# Patient Record
Sex: MALE | Race: OTHER
[De-identification: names, ages, dates, MRNs, and addresses within clinical notes are randomized per-mention and may not be internally consistent; named-entity substitution may affect disease eponyms.]

---

## 2018-07-10 ENCOUNTER — HOSPITAL ENCOUNTER (EMERGENCY)
Dept: HOSPITAL 84 - D.ER | Age: 28
Discharge: HOME | End: 2018-07-10
Payer: COMMERCIAL

## 2018-07-10 VITALS — DIASTOLIC BLOOD PRESSURE: 74 MMHG | SYSTOLIC BLOOD PRESSURE: 125 MMHG

## 2018-07-10 VITALS — HEIGHT: 70 IN | WEIGHT: 175 LBS | BODY MASS INDEX: 25.05 KG/M2

## 2018-07-10 DIAGNOSIS — F17.200: ICD-10-CM

## 2018-07-10 DIAGNOSIS — K08.89: Primary | ICD-10-CM

## 2018-07-10 DIAGNOSIS — K02.9: ICD-10-CM

## 2018-07-10 DIAGNOSIS — K04.7: ICD-10-CM

## 2020-06-26 ENCOUNTER — HOSPITAL ENCOUNTER (INPATIENT)
Dept: HOSPITAL 84 - D.ER | Age: 30
LOS: 5 days | Discharge: HOME | DRG: 854 | End: 2020-07-01
Attending: FAMILY MEDICINE | Admitting: FAMILY MEDICINE
Payer: MEDICAID

## 2020-06-26 VITALS
WEIGHT: 184.39 LBS | BODY MASS INDEX: 26.4 KG/M2 | HEIGHT: 70 IN | WEIGHT: 184.39 LBS | BODY MASS INDEX: 26.4 KG/M2 | BODY MASS INDEX: 26.4 KG/M2 | HEIGHT: 70 IN

## 2020-06-26 VITALS — DIASTOLIC BLOOD PRESSURE: 81 MMHG | SYSTOLIC BLOOD PRESSURE: 135 MMHG

## 2020-06-26 VITALS — DIASTOLIC BLOOD PRESSURE: 69 MMHG | SYSTOLIC BLOOD PRESSURE: 124 MMHG

## 2020-06-26 DIAGNOSIS — A41.9: Primary | ICD-10-CM

## 2020-06-26 DIAGNOSIS — F11.90: ICD-10-CM

## 2020-06-26 DIAGNOSIS — N43.3: ICD-10-CM

## 2020-06-26 DIAGNOSIS — N39.0: ICD-10-CM

## 2020-06-26 DIAGNOSIS — F15.90: ICD-10-CM

## 2020-06-26 DIAGNOSIS — F12.90: ICD-10-CM

## 2020-06-26 DIAGNOSIS — D64.9: ICD-10-CM

## 2020-06-26 DIAGNOSIS — N50.3: ICD-10-CM

## 2020-06-26 DIAGNOSIS — K02.9: ICD-10-CM

## 2020-06-26 DIAGNOSIS — N12: ICD-10-CM

## 2020-06-26 LAB
ALBUMIN SERPL-MCNC: 3.9 G/DL (ref 3.4–5)
ALP SERPL-CCNC: 85 U/L (ref 30–120)
ALT SERPL-CCNC: 24 U/L (ref 10–68)
AMYLASE SERPL-CCNC: 21 U/L (ref 25–115)
ANION GAP SERPL CALC-SCNC: 13.3 MMOL/L (ref 8–16)
BACTERIA #/AREA URNS HPF: (no result) /HPF
BASOPHILS NFR BLD AUTO: 1 % (ref 0–2)
BILIRUB SERPL-MCNC: 0.39 MG/DL (ref 0.2–1.3)
BILIRUB SERPL-MCNC: NEGATIVE MG/DL
BUN SERPL-MCNC: 12 MG/DL (ref 7–18)
CALCIUM SERPL-MCNC: 9 MG/DL (ref 8.5–10.1)
CHLORIDE SERPL-SCNC: 101 MMOL/L (ref 98–107)
CO2 SERPL-SCNC: 26.6 MMOL/L (ref 21–32)
CREAT SERPL-MCNC: 0.8 MG/DL (ref 0.6–1.3)
ERYTHROCYTE [DISTWIDTH] IN BLOOD BY AUTOMATED COUNT: 13.8 % (ref 11.5–14.5)
GLOBULIN SER-MCNC: 3.9 G/L
GLUCOSE SERPL-MCNC: 119 MG/DL (ref 74–106)
GLUCOSE SERPL-MCNC: NEGATIVE MG/DL
HCT VFR BLD CALC: 39.1 % (ref 42–54)
HGB BLD-MCNC: 13.2 G/DL (ref 13.5–17.5)
KETONES UR STRIP-MCNC: (no result) MG/DL
LIPASE SERPL-CCNC: 39 U/L (ref 73–393)
LYMPHOCYTES NFR BLD AUTO: 10 % (ref 15–50)
MCH RBC QN AUTO: 31 PG (ref 26–34)
MCHC RBC AUTO-ENTMCNC: 33.8 G/DL (ref 31–37)
MCV RBC: 91.8 FL (ref 80–100)
MONOCYTES NFR BLD: 11 % (ref 2–11)
NEUTROPHILS NFR BLD AUTO: 78 % (ref 40–80)
NITRITE UR-MCNC: NEGATIVE MG/ML
OSMOLALITY SERPL CALC.SUM OF ELEC: 274 MOSM/KG (ref 275–300)
PH UR STRIP: 8 [PH] (ref 5–6)
PLATELET # BLD EST: NORMAL 10*3/UL
PLATELET # BLD: 384 10X3/UL (ref 130–400)
PMV BLD AUTO: 9.2 FL (ref 7.4–10.4)
POTASSIUM SERPL-SCNC: 3.9 MMOL/L (ref 3.5–5.1)
PROT SERPL-MCNC: 7.8 G/DL (ref 6.4–8.2)
RBC # BLD AUTO: 4.26 10X6/UL (ref 4.2–6.1)
RBC #/AREA URNS HPF: (no result) /HPF (ref 0–5)
SODIUM SERPL-SCNC: 137 MMOL/L (ref 136–145)
SP GR UR STRIP: 1.01 (ref 1–1.02)
SQUAMOUS #/AREA URNS HPF: (no result) /HPF (ref 0–5)
TROPONIN I SERPL-MCNC: < 0.017 NG/ML (ref 0–0.06)
UROBILINOGEN UR-MCNC: 4 MG/DL
WBC # BLD AUTO: 32.8 10X3/UL (ref 4.8–10.8)

## 2020-06-26 NOTE — NUR
RECIEVED PT FROM ED. PT IS ROLLING AROUND IN BED IN PAIN. IT IS APPARENT THAT
THE PATIENT IS IN PAIN. RATES PAIN ON A SCALE AS A 10/10. RIGHT FLANK PAIN,
BACK PAIN, AND PAIN IN ONE TESTICLE IS BEING REPORTED. WILL ADMINISTER PRN
TYLENOL. APPEARS THAT MORPHINE IS NOT MANAGING OR HELPING PAIN SUBSIDE,
REQUESTING ANY DIFFERENT KIND OF MEDICATION.

## 2020-06-26 NOTE — NUR
ADMINISTERED PRN TYLENOL FOR 10/10 PAIN. LEFT PT RESTING IN BED, VERY
UNCOMFORTABLE AND COMPLAINING OF SEVERE PAIN. FAMILY AT BEDSIDE. WILL CONTINUE
TO MONITOR.

## 2020-06-26 NOTE — NUR
ADMINISTERED PRN DEMEROL FOR 10/10. PT SEEMS TO STILL BE IN MODERATE/SEVERE
PAIN BASED ON FACIAL GRIMACING. RESTING IN BED, FAMILY AT BEDSIDE. PROVIDED
WITH URINE SPECIMEN CUP. WILL CONTINUE TO MONITOR.

## 2020-06-26 NOTE — NUR
ADMINISTERED PRN DEMEROL FOR PAIN 4/10. TOLERATED WELL. FAMILY AT BEDSIDE.
QUESTIONED ABOUT TESTS AND RESULTS, ANSWERED ALL QUESTIONS. DENIES ANY OTHER
NEEDS. WILL CONTINUE TO MONITOR.

## 2020-06-26 NOTE — NUR
NEW BAG OF FLUIDS HUNG. PT RESTING IN BED, DEMEROL APPEARS TO HAVE HELPED
SUBSIDE PT PAIN. REPORTS TO BE "IN LESS PAIN" NOW. WILL CONTINUE TO MONITOR.
FAMILY IS AT BEDSIDE.

## 2020-06-27 VITALS — SYSTOLIC BLOOD PRESSURE: 120 MMHG | DIASTOLIC BLOOD PRESSURE: 71 MMHG

## 2020-06-27 VITALS — DIASTOLIC BLOOD PRESSURE: 58 MMHG | SYSTOLIC BLOOD PRESSURE: 109 MMHG

## 2020-06-27 VITALS — SYSTOLIC BLOOD PRESSURE: 101 MMHG | DIASTOLIC BLOOD PRESSURE: 62 MMHG

## 2020-06-27 VITALS — SYSTOLIC BLOOD PRESSURE: 123 MMHG | DIASTOLIC BLOOD PRESSURE: 80 MMHG

## 2020-06-27 VITALS — SYSTOLIC BLOOD PRESSURE: 124 MMHG | DIASTOLIC BLOOD PRESSURE: 79 MMHG

## 2020-06-27 VITALS — SYSTOLIC BLOOD PRESSURE: 120 MMHG | DIASTOLIC BLOOD PRESSURE: 78 MMHG

## 2020-06-27 VITALS — DIASTOLIC BLOOD PRESSURE: 83 MMHG | SYSTOLIC BLOOD PRESSURE: 121 MMHG

## 2020-06-27 VITALS — SYSTOLIC BLOOD PRESSURE: 97 MMHG | DIASTOLIC BLOOD PRESSURE: 49 MMHG

## 2020-06-27 VITALS — DIASTOLIC BLOOD PRESSURE: 74 MMHG | SYSTOLIC BLOOD PRESSURE: 118 MMHG

## 2020-06-27 VITALS — SYSTOLIC BLOOD PRESSURE: 119 MMHG | DIASTOLIC BLOOD PRESSURE: 63 MMHG

## 2020-06-27 LAB
ALBUMIN SERPL-MCNC: 2.6 G/DL (ref 3.4–5)
ALP SERPL-CCNC: 84 U/L (ref 30–120)
ALT SERPL-CCNC: 18 U/L (ref 10–68)
AMPHETAMINES UR QL SCN: POSITIVE QUAL
ANION GAP SERPL CALC-SCNC: 7.4 MMOL/L (ref 8–16)
BARBITURATES UR QL SCN: NEGATIVE QUAL
BASOPHILS NFR BLD AUTO: 0.1 % (ref 0–2)
BENZODIAZ UR QL SCN: POSITIVE QUAL
BILIRUB SERPL-MCNC: 0.38 MG/DL (ref 0.2–1.3)
BUN SERPL-MCNC: 8 MG/DL (ref 7–18)
BZE UR QL SCN: NEGATIVE QUAL
CALCIUM SERPL-MCNC: 8.1 MG/DL (ref 8.5–10.1)
CANNABINOIDS UR QL SCN: POSITIVE QUAL
CHLORIDE SERPL-SCNC: 104 MMOL/L (ref 98–107)
CO2 SERPL-SCNC: 27.9 MMOL/L (ref 21–32)
CREAT SERPL-MCNC: 0.9 MG/DL (ref 0.6–1.3)
CRP SERPL-MCNC: 30.1 MG/DL (ref 0–0.9)
EOSINOPHIL NFR BLD: 5.8 % (ref 0–7)
ERYTHROCYTE [DISTWIDTH] IN BLOOD BY AUTOMATED COUNT: 14.2 % (ref 11.5–14.5)
GLOBULIN SER-MCNC: 3.6 G/L
GLUCOSE SERPL-MCNC: 97 MG/DL (ref 74–106)
HCT VFR BLD CALC: 33.5 % (ref 42–54)
HGB BLD-MCNC: 10.7 G/DL (ref 13.5–17.5)
IMM GRANULOCYTES NFR BLD: 2 % (ref 0–5)
LYMPHOCYTES NFR BLD AUTO: 6.6 % (ref 15–50)
MCH RBC QN AUTO: 30 PG (ref 26–34)
MCHC RBC AUTO-ENTMCNC: 31.9 G/DL (ref 31–37)
MCV RBC: 93.8 FL (ref 80–100)
MONOCYTES NFR BLD: 4.7 % (ref 2–11)
NEUTROPHILS NFR BLD AUTO: 80.8 % (ref 40–80)
OPIATES UR QL SCN: POSITIVE QUAL
OSMOLALITY SERPL CALC.SUM OF ELEC: 269 MOSM/KG (ref 275–300)
PCP UR QL SCN: NEGATIVE QUAL
PLATELET # BLD: 334 10X3/UL (ref 130–400)
PMV BLD AUTO: 9.5 FL (ref 7.4–10.4)
POTASSIUM SERPL-SCNC: 3.3 MMOL/L (ref 3.5–5.1)
PROT SERPL-MCNC: 6.2 G/DL (ref 6.4–8.2)
RBC # BLD AUTO: 3.57 10X6/UL (ref 4.2–6.1)
SODIUM SERPL-SCNC: 136 MMOL/L (ref 136–145)
WBC # BLD AUTO: 37.5 10X3/UL (ref 4.8–10.8)

## 2020-06-27 PROCEDURE — 05HM33Z INSERTION OF INFUSION DEVICE INTO RIGHT INTERNAL JUGULAR VEIN, PERCUTANEOUS APPROACH: ICD-10-PCS | Performed by: SURGERY

## 2020-06-27 PROCEDURE — 0WJG4ZZ INSPECTION OF PERITONEAL CAVITY, PERCUTANEOUS ENDOSCOPIC APPROACH: ICD-10-PCS | Performed by: SURGERY

## 2020-06-27 PROCEDURE — 0DTJ4ZZ RESECTION OF APPENDIX, PERCUTANEOUS ENDOSCOPIC APPROACH: ICD-10-PCS | Performed by: SURGERY

## 2020-06-27 NOTE — NUR
PT IS HERE FROM PACU IN STABLE CONDITION A&OX4, BUT DROGGY. HIS VSS. DONE FULL
ASSESSMENT AND WILL DOC IN FLOW SHEET. BRO IS OBSERVED HANGING BELOW BLADDER
AND DRAINING. ART LINE IS CDI SECURE WITH ARM BOARD TO LEFT WRIST. RIGHT IJ IS
CDI IN THE RIGHT NECK. PT IS SLEEPY AND I HAVE TO WAKE HIM TO ASK IF HE IS
HURTING AND HE SAYS "YES, IN MY STOMACH". HE HAS RECIVED 2MG OF DAILAUDID IV
FROM PACU. I WILL LOOK AT MY NEW ORDERS TO SEE WHAT PT HAS HERE IN ICU. HIS
BED IS LOW,SIDE RAISLX2, ORIENTED TO HIS CALL LIGHT. BED ALARM IS ON

## 2020-06-27 NOTE — NUR
PT IS RESTING IN BED WITH EYES CLOSED. AWAKES EASILY. HE IS A&OX4, VSS.
PERFORMED RE-ASSESSMENT AT THIS TIME AND WILL DOC IN FLOW SHEET. PT VOICES NO
NEEDS OR C/O AT THIS TIME. BED IS LOW,SIDE RAISLX2, CALL LIGHT WITHIN REACH.
BED ALARM IS ON

## 2020-06-27 NOTE — NUR
PT TAKEN TO SURGERY. MOTHER TOLD SHE COULD WALK WITH THEM THEN RETURN TO ROOM
OR ICU WAITING ROOM AND WAIT FOR DR. PALMA.

## 2020-06-27 NOTE — NUR
pt used call light and asked if he could have something to eat. provided him
with some jello and educated him that because he had surgery its better to
start slow and light. he voiced"ok". i also informed him that if he is to get
nauseus at any time to let me know and to stop eating. he vocalized
understanding. VSS. bed is low,side raislx2,call light within rech. bed alarm
is on. will conitnue to monitor

## 2020-06-27 NOTE — NUR
PT RESTING COMFORTABLY. BREATHS EVEN/REGULAR AND UNLABORED. NO SIGNS OR
SYMPTOMS OF ACUTE DISTRESSS NTOED AT THIS TIME. CL IN REACH, SRX2.

## 2020-06-27 NOTE — NUR
PT ARRIVED FROM SURGERY TO ICU BED. PT IS A&O X4. PT IS COMMUNICATING AND
VERBALIZED UNDERSTANDING AND LINES TO BE CAUTIOUS OF. PT HAS A L.FA PIV, A
LEFT RADIUS ART LINE AND A RIGHT IJ TRIPLE LUMEN CVL. ALL LINES HAVE SWAB CAPS
IN USE. PT HAS 3 LAP INCISIONS WITH BAND-AIDS CDI. BRO CATHETER IN PLACE AND
DRAINING TO GRAVITY CLEAR YELLOW URINE OFF R.SIDE OF BED. VSS. PACU NURSE GAVE
PRIMARY ICU RN BEDSIDE REPORT. PTS MOTHER WAS NOTIFIED AND UPDATED AND
 SPOKE TO HER. NO FURTHER NEEDS AT THIS TIME. WILL CPOC.

## 2020-06-27 NOTE — NUR
pt is resting in bed with eyes closed snoring. HIS VSS.  awakes easily. voices
pain is "6/10" numeric scale after reciving norco po for prn pain. he voices
no needs or other concerns. his 3 abd incisions are CDI c no s/s of bleeding.
bed is low,side rialsx2,call light within reach. will contineu to monitor

## 2020-06-28 VITALS — DIASTOLIC BLOOD PRESSURE: 77 MMHG | SYSTOLIC BLOOD PRESSURE: 116 MMHG

## 2020-06-28 VITALS — DIASTOLIC BLOOD PRESSURE: 67 MMHG | SYSTOLIC BLOOD PRESSURE: 113 MMHG

## 2020-06-28 VITALS — DIASTOLIC BLOOD PRESSURE: 75 MMHG | SYSTOLIC BLOOD PRESSURE: 114 MMHG

## 2020-06-28 VITALS — SYSTOLIC BLOOD PRESSURE: 118 MMHG | DIASTOLIC BLOOD PRESSURE: 75 MMHG

## 2020-06-28 VITALS — DIASTOLIC BLOOD PRESSURE: 81 MMHG | SYSTOLIC BLOOD PRESSURE: 130 MMHG

## 2020-06-28 VITALS — DIASTOLIC BLOOD PRESSURE: 68 MMHG | SYSTOLIC BLOOD PRESSURE: 123 MMHG

## 2020-06-28 VITALS — SYSTOLIC BLOOD PRESSURE: 120 MMHG | DIASTOLIC BLOOD PRESSURE: 76 MMHG

## 2020-06-28 VITALS — DIASTOLIC BLOOD PRESSURE: 68 MMHG | SYSTOLIC BLOOD PRESSURE: 128 MMHG

## 2020-06-28 VITALS — SYSTOLIC BLOOD PRESSURE: 132 MMHG | DIASTOLIC BLOOD PRESSURE: 87 MMHG

## 2020-06-28 VITALS — SYSTOLIC BLOOD PRESSURE: 119 MMHG | DIASTOLIC BLOOD PRESSURE: 61 MMHG

## 2020-06-28 VITALS — SYSTOLIC BLOOD PRESSURE: 118 MMHG | DIASTOLIC BLOOD PRESSURE: 73 MMHG

## 2020-06-28 VITALS — SYSTOLIC BLOOD PRESSURE: 113 MMHG | DIASTOLIC BLOOD PRESSURE: 70 MMHG

## 2020-06-28 VITALS — SYSTOLIC BLOOD PRESSURE: 117 MMHG | DIASTOLIC BLOOD PRESSURE: 73 MMHG

## 2020-06-28 VITALS — DIASTOLIC BLOOD PRESSURE: 75 MMHG | SYSTOLIC BLOOD PRESSURE: 111 MMHG

## 2020-06-28 VITALS — SYSTOLIC BLOOD PRESSURE: 130 MMHG | DIASTOLIC BLOOD PRESSURE: 65 MMHG

## 2020-06-28 VITALS — DIASTOLIC BLOOD PRESSURE: 68 MMHG | SYSTOLIC BLOOD PRESSURE: 120 MMHG

## 2020-06-28 VITALS — SYSTOLIC BLOOD PRESSURE: 110 MMHG | DIASTOLIC BLOOD PRESSURE: 58 MMHG

## 2020-06-28 VITALS — SYSTOLIC BLOOD PRESSURE: 113 MMHG | DIASTOLIC BLOOD PRESSURE: 68 MMHG

## 2020-06-28 VITALS — SYSTOLIC BLOOD PRESSURE: 129 MMHG | DIASTOLIC BLOOD PRESSURE: 78 MMHG

## 2020-06-28 VITALS — SYSTOLIC BLOOD PRESSURE: 108 MMHG | DIASTOLIC BLOOD PRESSURE: 58 MMHG

## 2020-06-28 LAB
EOSINOPHIL NFR BLD: 1 % (ref 0–7)
ERYTHROCYTE [DISTWIDTH] IN BLOOD BY AUTOMATED COUNT: 14.2 % (ref 11.5–14.5)
HCT VFR BLD CALC: 33.5 % (ref 42–54)
HGB BLD-MCNC: 10.9 G/DL (ref 13.5–17.5)
LYMPHOCYTES NFR BLD AUTO: 7 % (ref 15–50)
MCH RBC QN AUTO: 30.4 PG (ref 26–34)
MCHC RBC AUTO-ENTMCNC: 32.5 G/DL (ref 31–37)
MCV RBC: 93.6 FL (ref 80–100)
MONOCYTES NFR BLD: 9 % (ref 2–11)
NEUTROPHILS NFR BLD AUTO: 83 % (ref 40–80)
PLATELET # BLD EST: NORMAL 10*3/UL
PLATELET # BLD: 348 10X3/UL (ref 130–400)
PMV BLD AUTO: 9.7 FL (ref 7.4–10.4)
RBC # BLD AUTO: 3.58 10X6/UL (ref 4.2–6.1)
WBC # BLD AUTO: 24.8 10X3/UL (ref 4.8–10.8)

## 2020-06-28 NOTE — NUR
RESTING IN BED AT THIS TIME WITH EYES CLOSED. RESPIRATIONS STEADY AND
UNLABORED. AWAKENS EASILY WHEN SPOKEN TO. VSS. WILL CONTINUE PLAN OF CARE.

## 2020-06-28 NOTE — NUR
LYING IN BED RESTING AT THIS TIME WITH EYES CLOSED. RESPIRATIONS STEADY AND
UNLABORED. VSS. NO ACUTE DISTRESS NOTED. AWAKENS EASILY WHEN SPOKEN TO. PT IS
ALERT AND ORIENTED. LAP SITES TO ABDOMEN WDL. SEE SHIFT ASSESSMENT. WILL
CONTINUE PLAN OF CARE.

## 2020-06-28 NOTE — NUR
PT IS RESTING IN BED WITH EYES CLOSED. VSS. BED IS LOW,SIDE RAILSX2,CALL LIGTH
WITHIN REACH. WILL CONINTUE TO MONITOR

## 2020-06-28 NOTE — NUR
PT IS RESTING IN BED WITH EYES CLOSED.VSS. AWAKENS EASILY. INFORMED HIM THAT
BREAKFAST WILL BE HERE SOMETIME AFTER 7. HE VOICED"O GOOD". NO NEEDS OR C/O
VOICED. BED IS LOW,SIDE RAISLX2,CALL LIGHT WIHTIN REACH. BED ALARM IS ON.

## 2020-06-28 NOTE — NUR
PT IS RESTING IN BED WITH EYES CLOSED. VSS. BED IS LOW,SIDE RAISLX2,CALL LIGHT
WITHIN REACH. BED ALARM IS ON.WILL CONINTUE TO MONITOR

## 2020-06-28 NOTE — NUR
RECIVED BEDSIDE SHIFT REPORT. PT IS AWAKE AND WATCHIN TV. HE JUST HAD BRO
CATETOR TAKEN OUT BY DAYSHIFT NURSE AND ARTLINE REMOVED FROM LEFT WRIST. LEFT
WRIST SHOWS NO S/S OF BLEEDING. PROVIDED PT WITH URINAL FOR WHEN HE NEEDS TO
GO PEE. RE-ORIENTED HIM TO USE HIS CALL LIGHT IF HE NEED ASSISTANCE OR IF TO
GET UP. HE VERABLIZED UNDERSTANDING. HE IS A&OX4. HE VOICES NO NEEDS OR PAIN
AT THIS TIME. PERFORMED FULL-ASSESSMENT AND WILL DOC IN FLOW SHEET. HIS BED IS
LOW,SIDE RAISLX2,CALL LIGHT WITHIN REACH. BED ALARM IS ON. WILL CONTINUE TO
MONITOR

## 2020-06-28 NOTE — NUR
BRO DCD AT THIS TIME. CATHETER TIP INTACT. ALSO A LINE TO LT RADIAL DCD
CATHETER TIP INTACT, PRESSURE HELD TO SITE X 5 MIN AND PRESSURE DRESSING
PLACED TO SITE. NO S/S BLEEDING/HEMATOMA. VSS. NO ACUTE DISTRESS NOTED. WILL
CONTINUE PLAN OF CARE.

## 2020-06-28 NOTE — NUR
PT IS RESTING IN BED AWAKE. I ASK HIM IF THERE IS ANYTHING I CAN DO FOR HIM.
HE VOICED"NO, IM GOING TO TRY TO GO TO SLEEP". VSS. BED IS LOW,SIDE
RAISLX2,CALL LIGHT WITHIN REACH. BED ALARM ON. WILL CONINTUE TO MONITOR

## 2020-06-28 NOTE — NUR
PT IS RESTING IN BED WITH EYES CLOSED. AWAKES EASILY. VSS. I ASKED HIM IF HE
IS IN ANY PAIN. HE VOICED"IM STILL VERY SORE". SAYS "6/10" NUMERIC SCALE. WILL
SCAN AND ADMINSISTER PAIN MEDICATION AS ORDERED PRN. PROVIDED HIM WITH A FRESH
WATER. HE MOVES SELF IN BED. BED IS LOW,SIDE RASILX2,CALL LIGHT WITHIN REACH.
BED ALARM IS ON. WILL CONINTUE TO MONITOR

## 2020-06-28 NOTE — NUR
PT IS RESTING IN BED WITH EYES CLOSED. VSS. AWAKES EASILY. I ASK HIM IF HE IS
IN ANY PAIN. HE VOICES "YEA ALITTLE". IT IS ALMOST TIME FOR HIM TO HAVE
ANOTHER PAIN PILL PER PRN ORDER. THE GOAL IS FOR ME TO KEEP THE PT PAIN
UNDERCONTROLL. WILL GET PAIN MED, SCAN AND ADMINSITER ON MAR. NO OTHER NEEDS
WERE VOICED. BED IS LEFT LOW,SIDE RAISLX2,CALL LIGHT WITHIN REACH. BED ALARM
IS ON. WILL CONTINUE TO MONITOR

## 2020-06-28 NOTE — NUR
PT IS RESTING IN BED WITH EYES CLOSED. VSS. AWAKES EASILY. VOICES"IM DOING
GOOD". WHEN ASKED IF HE NEEDS ANYTHING HE VOICES "NO". PERFORMED RE-ASSESSMENT
AND WILL DOC IN FLOWSHEET. BED IS LOW,SIDE RAISXL2, CALL LIGHT WITHIN REACH.
BED ALARM IS ON. WILL CONINTUE TO MONITOR

## 2020-06-28 NOTE — NUR
RESTING IN BED AT THIS TIME. VSS. NO ACUTE DISTRESS NOTED. CALL LIGHT IN
REACH. WILL CONTINUE PLAN OF CARE.

## 2020-06-29 VITALS — DIASTOLIC BLOOD PRESSURE: 86 MMHG | SYSTOLIC BLOOD PRESSURE: 125 MMHG

## 2020-06-29 VITALS — DIASTOLIC BLOOD PRESSURE: 99 MMHG | SYSTOLIC BLOOD PRESSURE: 121 MMHG

## 2020-06-29 VITALS — SYSTOLIC BLOOD PRESSURE: 102 MMHG | DIASTOLIC BLOOD PRESSURE: 83 MMHG

## 2020-06-29 VITALS — SYSTOLIC BLOOD PRESSURE: 148 MMHG | DIASTOLIC BLOOD PRESSURE: 99 MMHG

## 2020-06-29 VITALS — SYSTOLIC BLOOD PRESSURE: 138 MMHG | DIASTOLIC BLOOD PRESSURE: 82 MMHG

## 2020-06-29 VITALS — SYSTOLIC BLOOD PRESSURE: 133 MMHG | DIASTOLIC BLOOD PRESSURE: 86 MMHG

## 2020-06-29 VITALS — SYSTOLIC BLOOD PRESSURE: 165 MMHG | DIASTOLIC BLOOD PRESSURE: 102 MMHG

## 2020-06-29 VITALS — SYSTOLIC BLOOD PRESSURE: 117 MMHG | DIASTOLIC BLOOD PRESSURE: 84 MMHG

## 2020-06-29 VITALS — DIASTOLIC BLOOD PRESSURE: 100 MMHG | SYSTOLIC BLOOD PRESSURE: 147 MMHG

## 2020-06-29 VITALS — DIASTOLIC BLOOD PRESSURE: 102 MMHG | SYSTOLIC BLOOD PRESSURE: 165 MMHG

## 2020-06-29 VITALS — SYSTOLIC BLOOD PRESSURE: 135 MMHG | DIASTOLIC BLOOD PRESSURE: 106 MMHG

## 2020-06-29 LAB
ALBUMIN SERPL-MCNC: 2.3 G/DL (ref 3.4–5)
ALP SERPL-CCNC: 105 U/L (ref 30–120)
ALT SERPL-CCNC: 28 U/L (ref 10–68)
ANION GAP SERPL CALC-SCNC: 4.8 MMOL/L (ref 8–16)
BASOPHILS NFR BLD AUTO: 0.1 % (ref 0–2)
BILIRUB SERPL-MCNC: 0.21 MG/DL (ref 0.2–1.3)
BUN SERPL-MCNC: 11 MG/DL (ref 7–18)
CALCIUM SERPL-MCNC: 7.6 MG/DL (ref 8.5–10.1)
CHLORIDE SERPL-SCNC: 107 MMOL/L (ref 98–107)
CO2 SERPL-SCNC: 29.7 MMOL/L (ref 21–32)
CREAT SERPL-MCNC: 0.9 MG/DL (ref 0.6–1.3)
CRP SERPL-MCNC: 11.4 MG/DL (ref 0–0.9)
EOSINOPHIL NFR BLD: 0.6 % (ref 0–7)
ERYTHROCYTE [DISTWIDTH] IN BLOOD BY AUTOMATED COUNT: 14.1 % (ref 11.5–14.5)
GLOBULIN SER-MCNC: 3.7 G/L
GLUCOSE SERPL-MCNC: 102 MG/DL (ref 74–106)
HCT VFR BLD CALC: 31.7 % (ref 42–54)
HGB BLD-MCNC: 10.2 G/DL (ref 13.5–17.5)
IMM GRANULOCYTES NFR BLD: 0.3 % (ref 0–5)
LYMPHOCYTES NFR BLD AUTO: 15.8 % (ref 15–50)
MCH RBC QN AUTO: 30 PG (ref 26–34)
MCHC RBC AUTO-ENTMCNC: 32.2 G/DL (ref 31–37)
MCV RBC: 93.2 FL (ref 80–100)
MONOCYTES NFR BLD: 4.2 % (ref 2–11)
NEUTROPHILS NFR BLD AUTO: 79 % (ref 40–80)
OSMOLALITY SERPL CALC.SUM OF ELEC: 274 MOSM/KG (ref 275–300)
PLATELET # BLD: 346 10X3/UL (ref 130–400)
PMV BLD AUTO: 9.4 FL (ref 7.4–10.4)
POTASSIUM SERPL-SCNC: 3.5 MMOL/L (ref 3.5–5.1)
PROT SERPL-MCNC: 6 G/DL (ref 6.4–8.2)
RBC # BLD AUTO: 3.4 10X6/UL (ref 4.2–6.1)
SODIUM SERPL-SCNC: 138 MMOL/L (ref 136–145)
WBC # BLD AUTO: 17.5 10X3/UL (ref 4.8–10.8)

## 2020-06-29 NOTE — NUR
PT IS RESTING IN BED SNORING. VSS. BED IS OW,SIDE RAISLX2,CALL LIGHT WITHIN
REACH. WILL CONITNUE TO MONITOR

## 2020-06-29 NOTE — NUR
pt is resting in bed with eyes closed. vss. bed is low,side railsx2,call light
within reach. will continue to monitor

## 2020-06-29 NOTE — NUR
1100 NORCO 5 MG GOVEN PO FOR PAIN 7/10 TO ABDOMIAL INCISION AREA  STATES THAT
IT IS HARD TO LAY ON HIS SIDE

## 2020-06-29 NOTE — NUR
PT IS RESTINGIN IN BED WITH EYES CLOSED. AWAKES EASILY. VSS. NO NEEDS OR C/O
VOICED. BED IS LOW,SIDE RAISLX2,CALL LIGHT WITHIN REACH. WILL CONITNUE TO
MONITOR

## 2020-06-29 NOTE — NUR
PT IS RESTING IN BED WITH EYES CLOSED. AWAKES EAISLY. IS A&OX4. VSS. PERFORMED
RE-ASSESSMENT AT THIS TIME. NO NEEDS OR C/O VOICED. BED IS LEFT LOW,SIDE
RAILSX2,CALL LIGHT WITHIN REACH. BED ALARM IS ON. WILL CONITUE TO MONITOR

## 2020-06-29 NOTE — NUR
PATIENT TRANSFERED FROM ICU. PATIENT IS ALERT AND ORIENTED, RESTING
COMFORTABLY IN BED. RESPIRATIONS ARE EVEN AND UNLABORED. NO S/S OF DISTRESS.
NO C/O PAIN. CALL LIGHT WITHIN REACH. WILL CPOC.

## 2020-06-30 VITALS — DIASTOLIC BLOOD PRESSURE: 95 MMHG | SYSTOLIC BLOOD PRESSURE: 141 MMHG

## 2020-06-30 VITALS — DIASTOLIC BLOOD PRESSURE: 100 MMHG | SYSTOLIC BLOOD PRESSURE: 147 MMHG

## 2020-06-30 VITALS — DIASTOLIC BLOOD PRESSURE: 87 MMHG | SYSTOLIC BLOOD PRESSURE: 136 MMHG

## 2020-06-30 VITALS — SYSTOLIC BLOOD PRESSURE: 143 MMHG | DIASTOLIC BLOOD PRESSURE: 75 MMHG

## 2020-06-30 VITALS — SYSTOLIC BLOOD PRESSURE: 142 MMHG | DIASTOLIC BLOOD PRESSURE: 63 MMHG

## 2020-06-30 VITALS — DIASTOLIC BLOOD PRESSURE: 77 MMHG | SYSTOLIC BLOOD PRESSURE: 131 MMHG

## 2020-06-30 LAB
ANION GAP SERPL CALC-SCNC: 10.7 MMOL/L (ref 8–16)
BASOPHILS NFR BLD AUTO: 0.3 % (ref 0–2)
BUN SERPL-MCNC: 12 MG/DL (ref 7–18)
CALCIUM SERPL-MCNC: 8.3 MG/DL (ref 8.5–10.1)
CHLORIDE SERPL-SCNC: 105 MMOL/L (ref 98–107)
CO2 SERPL-SCNC: 26.1 MMOL/L (ref 21–32)
CREAT SERPL-MCNC: 0.9 MG/DL (ref 0.6–1.3)
EOSINOPHIL NFR BLD: 1.5 % (ref 0–7)
ERYTHROCYTE [DISTWIDTH] IN BLOOD BY AUTOMATED COUNT: 13.4 % (ref 11.5–14.5)
GLUCOSE SERPL-MCNC: 96 MG/DL (ref 74–106)
HCT VFR BLD CALC: 33.3 % (ref 42–54)
HCV AB S/CO SERPL IA: 0.2 S/CO RAT (ref 0–0.9)
HGB BLD-MCNC: 11 G/DL (ref 13.5–17.5)
IMM GRANULOCYTES NFR BLD: 0.3 % (ref 0–5)
LYMPHOCYTES NFR BLD AUTO: 32.5 % (ref 15–50)
MCH RBC QN AUTO: 30 PG (ref 26–34)
MCHC RBC AUTO-ENTMCNC: 33 G/DL (ref 31–37)
MCV RBC: 90.7 FL (ref 80–100)
MONOCYTES NFR BLD: 6.5 % (ref 2–11)
NEUTROPHILS NFR BLD AUTO: 58.9 % (ref 40–80)
OSMOLALITY SERPL CALC.SUM OF ELEC: 275 MOSM/KG (ref 275–300)
PLATELET # BLD: 344 10X3/UL (ref 130–400)
PMV BLD AUTO: 9.2 FL (ref 7.4–10.4)
POTASSIUM SERPL-SCNC: 3.8 MMOL/L (ref 3.5–5.1)
RBC # BLD AUTO: 3.67 10X6/UL (ref 4.2–6.1)
SODIUM SERPL-SCNC: 138 MMOL/L (ref 136–145)
WBC # BLD AUTO: 9.6 10X3/UL (ref 4.8–10.8)

## 2020-06-30 NOTE — OP
PATIENT NAME:  ANNEL BLEVINS                            MEDICAL RECORD: [id number]
:[birth year]                                             LOCATION:D.M2     D.2
                                                         ADMISSION DATE:20
SURGEON:  GARRICK PALMA MD            
 
 
DATE OF OPERATION:  2020
 
PREOPERATIVE DIAGNOSES:
1.  Right lower quadrant abdominal pain.
2.  Marked leukocytosis.
3.  Fever.
4.  Rule out acute abdomen.
 
POSTOPERATIVE DIAGNOSES:
1.  Marked leukocytosis.
2.  Fever.
3.  Right lower quadrant pain.
4.  No etiology for the patient's above symptoms.
 
PROCEDURES:
1.  Exploratory laparoscopy.
2.  Manipulation of small bowel (running the small bowel).
3.  Incidental appendectomy.
4.  Central venous line placement.
 
SURGEON:  Garrick Palma MD
 
ASSISTANT:  None.
 
BLOOD LOSS:  Minimal.
 
ANESTHESIA:  General.
 
COMPLICATIONS:  None.
 
The risks, possible complications and alternatives to the procedure were
explained to the patient.  He elects to proceed.  The discussion specifically
included, but was not limited to, bleeding requiring emergency reoperation,
infection, intestinal injury, colostomy or ileostomy formation.
 
OPERATIVE COURSE:  The patient was conveyed to the operating room urgently on
2020.  General anesthesia was induced by the anesthesia staff.  The
central venous line was placed as well as an arterial line.  The arterial line
was placed by the anesthesia staff.  I placed a central venous line.  These were
placed as we anticipated due to the patient's very high white count that he was
going to become septic and very ill and so these maneuvers were performed.  The
patient was positioned in the Trendelenburg position.  The right neck was
sterilely prepped and draped.  I percutaneously accessed the right internal
jugular vein in an antegrade fashion.  A guidewire was passed easily.  A small
skin nick was accomplished.  A vessel dilator was used to dilate the
subcutaneous tract.  A 16-cm triple lumen central venous catheter was inserted
to the hub.  It was sutured in place times 3.  All lumens flushed easily and
aspirated dark, nonpulsatile blood.
 
An x-ray will be obtained postoperatively.
 
The patient's abdomen was then sterilely prepped and draped.  A small skin
 
 
 
OPERATIVE REPORT                               [id number]    ANNEL BLEVINS          
 
 
incision was accomplished in the left upper quadrant.  Through the skin
incision, a Veress needle was inserted.  CO2 insufflation was begun.  Once a
sufficient pneumoperitoneum had been achieved, a 5-mm trocar was inserted
through this incision.  Under direct internal vision, utilizing a television
camera, two more 5-mm trocars were inserted, one was inserted in the left
periumbilical area.  The other one was inserted in the left lower quadrant. 
During insertion of the Veress needle and all trocars, there appeared to have
been no injury to the bowels, any intraperitoneal or retroperitoneal structures.
 
I then began my abdominal survey.  Utilizing laparoscopic Babcocks, I examined
the appendix, which was normal.  I examined the right colon, the transverse
colon, the descending colon, sigmoid colon and rectum, all of which were normal.
 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
quadrant sidewall.  I then began to run the small bowel utilizing laparoscopic
Babcocks.  No evidence of ileitis.  No evidence of enteritis.  No evidence of a
Meckel's diverticulum, I ran the entire small bowel.
 
I examined the liver.  No evidence of hepatitis.  The liver was not enlarged. 
The gallbladder was normal.  There was some reactive fluid, which is likely a
normal amount of peritoneal fluid that was in the right gutter over the
convexity of the right lobe of the liver.
 
There was no evidence of typhlitis.  I grasped the right colon, incised the
right line of Toldt.  I identified no colonic perforation.  No purulence.  No
colitis.
 
There was no evidence of an internal hernia.  No evidence of bowel ischemia or
infarction.  No evidence of gangrene of the small bowel.  There was a tiny
indirect right inguinal hernia, which was of no consequence.  I irrigated in all
quadrants and aspirated.  There was no bleeding even at low pressure of 8.
 
I changed out the left periumbilical 5-mm trocar for a 12-mm trocar.
 
I elected to remove the normal appendix in order to avoid diagnostic confusion
in the future should the patient have a recurrence or persistence of abdominal
pain.
 
The appendix was grasped.  Some adhesions from the appendix to the pelvic
sidewall in the right lower quadrant were taken down with the laparoscopic
EnSeal device.  A window was created in the mesoappendix.  I stapled across the
tip of the cecum with an Endo-THERESA type staple utilizing a blue load.  I then
took down the mesoappendix utilizing the EnSeal device.  The appendix was placed
within a bag retrieval device and was withdrawn through the 12-mm trocar.  There
was no bleeding even at low pressure of 8.  The 12-mm trocar was removed. 
Utilizing the Seymour-Jennifer suture closure device and 0 Vicryl sutures, I
closed the muscle at the 12-mm trocar site.
 
The skin incisions were closed with interrupted intracuticular 3-0 Vicryls. 
Benzoin and Steri-Strips were applied.
 
The patient was then extubated and conveyed to the intensive care unit where he
was in stable condition.  I have shown the laparoscopic photos to the patient's
mother.  We are going to obtain an hematology consultation.
 
TRANSINT:[id number] Voice Confirmation ID: [id number] DOCUMENT ID: [id number]
 
 
 
OPERATIVE REPORT                               [id number]    ANNEL BLEVINS ROBERT MD            
 
 
 
Electronically Signed by GARRICK PALMA on 20 at 1831
 
 
 
 
 
 
 
 
 
 
 
 
 
 
 
 
 
 
 
 
 
 
 
 
 
 
 
 
 
 
 
 
 
 
 
 
 
 
 
 
 
CC: TIMOTHY FREEDMAN MD, EVAN STOUT, JAZIEL MANJARREZ MD and [id number]
DICTATION DATE: 20     :     20 0809      ADM IN  
                                                                              
[facility]                                          
[address] [place] [address]

## 2020-07-01 VITALS — SYSTOLIC BLOOD PRESSURE: 128 MMHG | DIASTOLIC BLOOD PRESSURE: 79 MMHG

## 2020-07-01 VITALS — SYSTOLIC BLOOD PRESSURE: 127 MMHG | DIASTOLIC BLOOD PRESSURE: 80 MMHG

## 2020-07-01 VITALS — SYSTOLIC BLOOD PRESSURE: 123 MMHG | DIASTOLIC BLOOD PRESSURE: 75 MMHG

## 2020-07-01 LAB
ANION GAP SERPL CALC-SCNC: 6.8 MMOL/L (ref 8–16)
BASOPHILS NFR BLD AUTO: 0.4 % (ref 0–2)
BUN SERPL-MCNC: 10 MG/DL (ref 7–18)
CALCIUM SERPL-MCNC: 8.3 MG/DL (ref 8.5–10.1)
CHLORIDE SERPL-SCNC: 108 MMOL/L (ref 98–107)
CO2 SERPL-SCNC: 28.8 MMOL/L (ref 21–32)
CREAT SERPL-MCNC: 0.8 MG/DL (ref 0.6–1.3)
EOSINOPHIL NFR BLD: 3.8 % (ref 0–7)
ERYTHROCYTE [DISTWIDTH] IN BLOOD BY AUTOMATED COUNT: 13.4 % (ref 11.5–14.5)
GLUCOSE SERPL-MCNC: 97 MG/DL (ref 74–106)
HCT VFR BLD CALC: 35.9 % (ref 42–54)
HGB BLD-MCNC: 11.9 G/DL (ref 13.5–17.5)
IMM GRANULOCYTES NFR BLD: 1 % (ref 0–5)
LYMPHOCYTES NFR BLD AUTO: 27.7 % (ref 15–50)
MAGNESIUM SERPL-MCNC: 2.2 MG/DL (ref 1.8–2.4)
MCH RBC QN AUTO: 30.1 PG (ref 26–34)
MCHC RBC AUTO-ENTMCNC: 33.1 G/DL (ref 31–37)
MCV RBC: 90.9 FL (ref 80–100)
MONOCYTES NFR BLD: 11 % (ref 2–11)
NEUTROPHILS NFR BLD AUTO: 56.1 % (ref 40–80)
OSMOLALITY SERPL CALC.SUM OF ELEC: 277 MOSM/KG (ref 275–300)
PLATELET # BLD: 387 10X3/UL (ref 130–400)
PMV BLD AUTO: 9.3 FL (ref 7.4–10.4)
POTASSIUM SERPL-SCNC: 3.6 MMOL/L (ref 3.5–5.1)
RBC # BLD AUTO: 3.95 10X6/UL (ref 4.2–6.1)
SODIUM SERPL-SCNC: 140 MMOL/L (ref 136–145)
WBC # BLD AUTO: 7.6 10X3/UL (ref 4.8–10.8)

## 2020-07-01 NOTE — NUR
PT DISCHARGED HOME VIA WHEELCHAIR WITH FAMILY. TELEMETRY REMOVED AND RETURNED.
PIV AND CVL LINE DISCONTINUED. CATHETER TIP FULLY INTACT. NO S/S OF HEMATOMA
PRESENT. PT SIGNED PROPER DISCHARGE INSTRUCTIONS AND REMOVED ALL VALUABLES
FROM THE ROOM.

## 2020-07-01 NOTE — MORECARE
CASE MANAGEMENT DISCHARGE SUMMARY
 
 
PATIENT: ANNEL BLEVINS                      UNIT: [id number]
ACCOUNT#: [id number]                       ADM DATE: 20
AGE: 30     : [birth year]  SEX: M            ROOM/BED: D.2122    
AUTHOR: VICKI BRUNER                             PHYSICIAN:                               
 
REFERRING PHYSICIAN: EVAN STOUT DO            
DATE OF SERVICE: 20
Discharge Plan
 
 
Patient Name: ANNEL BLEVINS
Facility: [facility]:[place]
Encounter #: [id number]
Medical Record #: [id number]
: 1990
Planned Disposition: Home
Anticipated Discharge Date: 
 
Discharge Date: 
Expected LOS: 
Initial Reviewer: [id number]
Initial Review Date: 2020
Generated: 20   4:36 pm 
Comments
 
DCP- Discharge Planning
 
Updated by [id number]: Aracely Peraza on 20   2:37 pm CT
Patient Name: ANNEL BLEVINS                                     
Admission Status: ER   
Accout number: [id number]                              
Admission Date: 2020   
: 1990                                                        
Admission Diagnosis:RIGHT LOWER QUADRANT PAIN   
Attending: EVAN STOUT                                                
Current LOS:  5   
  
Anticipated DC Date:    
Planned Disposition: Home   
Primary Insurance: MEDICAID [place]   
  
  
Discharge Planning Comments: CM met with patient to complete initial dc 
planning assessment.  CM educated patient on the CM role and verbal consent 
given by patient to complete assessment.  CM verified patient's address, 
phone number, and emergency contact phone numbers.  Patient lives at  home 
with his mother.  Patient states he just got out of [facility] and has been 
 
routinely smoking methamphetamines.  States he realizes it is not good for 
his body.  CM provided drug rehab residency programs.   CM discussed 
availability of home health, rehab services, and medical equipment. Patient 
denied known discharge needs at this time. Transportation provider at 
discharge will be his mother . CM will continue to follow and will assist as 
needed with dc plans/needs.  
  
: Aracely Peraza
  
 
 
 
 
 
 
 
Patient Name: ANNEL BLEVINS
Encounter #: [id number]
Page [id number]
 
 
 
 
 
Electronically Signed by VICKI BRUNER on 20 at 1537
 
 
 
 
 
 
**All edits/amendments must be made on the electronic document**
 
DICTATION DATE: 20 1536     : REEMA  20 1536     
RPT#: [id number]                                DC DATE:        
                                               STATUS: ADM IN  
[facility]
 [address]
[place] [address]
***END OF REPORT***

## 2020-09-28 ENCOUNTER — HOSPITAL ENCOUNTER (INPATIENT)
Dept: HOSPITAL 84 - D.ER | Age: 30
Discharge: HOME | DRG: 153 | End: 2020-09-28
Attending: FAMILY MEDICINE | Admitting: FAMILY MEDICINE
Payer: MEDICAID

## 2020-09-28 VITALS — DIASTOLIC BLOOD PRESSURE: 69 MMHG | SYSTOLIC BLOOD PRESSURE: 125 MMHG

## 2020-09-28 VITALS — BODY MASS INDEX: 26.54 KG/M2 | HEIGHT: 70 IN | WEIGHT: 185.39 LBS

## 2020-09-28 VITALS — DIASTOLIC BLOOD PRESSURE: 57 MMHG | SYSTOLIC BLOOD PRESSURE: 122 MMHG

## 2020-09-28 DIAGNOSIS — E83.42: ICD-10-CM

## 2020-09-28 DIAGNOSIS — J02.0: Primary | ICD-10-CM

## 2020-09-28 DIAGNOSIS — F17.203: ICD-10-CM

## 2020-09-28 DIAGNOSIS — E87.1: ICD-10-CM

## 2020-09-28 DIAGNOSIS — E86.0: ICD-10-CM

## 2020-09-28 DIAGNOSIS — F12.90: ICD-10-CM

## 2020-09-28 DIAGNOSIS — E83.51: ICD-10-CM

## 2020-09-28 LAB
ALBUMIN SERPL-MCNC: 3.5 G/DL (ref 3.4–5)
ALP SERPL-CCNC: 72 U/L (ref 30–120)
ALT SERPL-CCNC: 14 U/L (ref 10–68)
ANION GAP SERPL CALC-SCNC: 8.5 MMOL/L (ref 8–16)
BACTERIA #/AREA URNS HPF: (no result) HPF
BILIRUB SERPL-MCNC: 0.35 MG/DL (ref 0.2–1.3)
BILIRUB SERPL-MCNC: NEGATIVE MG/DL
BUN SERPL-MCNC: 7 MG/DL (ref 7–18)
CALCIUM SERPL-MCNC: 8.4 MG/DL (ref 8.5–10.1)
CHLORIDE SERPL-SCNC: 99 MMOL/L (ref 98–107)
CO2 SERPL-SCNC: 27.1 MMOL/L (ref 21–32)
CREAT SERPL-MCNC: 0.9 MG/DL (ref 0.6–1.3)
ERYTHROCYTE [DISTWIDTH] IN BLOOD BY AUTOMATED COUNT: 13.7 % (ref 11.5–14.5)
GLOBULIN SER-MCNC: 3.7 G/L
GLUCOSE SERPL-MCNC: 116 MG/DL (ref 74–106)
HCT VFR BLD CALC: 41.4 % (ref 42–54)
HGB BLD-MCNC: 14 G/DL (ref 13.5–17.5)
KETONES UR STRIP-MCNC: (no result) MG/DL
LIPASE SERPL-CCNC: 46 U/L (ref 73–393)
LYMPHOCYTES NFR BLD AUTO: 9 % (ref 15–50)
MAGNESIUM SERPL-MCNC: 1.5 MG/DL (ref 1.8–2.4)
MCH RBC QN AUTO: 30.8 PG (ref 26–34)
MCHC RBC AUTO-ENTMCNC: 33.8 G/DL (ref 31–37)
MCV RBC: 91.2 FL (ref 80–100)
MONOCYTES NFR BLD: 12 % (ref 2–11)
NEUTROPHILS NFR BLD AUTO: 71 % (ref 40–80)
NITRITE UR-MCNC: NEGATIVE MG/ML
OSMOLALITY SERPL CALC.SUM OF ELEC: 261 MOSM/KG (ref 275–300)
PH UR STRIP: 8 [PH] (ref 5–8)
PLATELET # BLD EST: NORMAL 10*3/UL
PLATELET # BLD: 207 10X3/UL (ref 130–400)
PMV BLD AUTO: 9.6 FL (ref 7.4–10.4)
POTASSIUM SERPL-SCNC: 3.6 MMOL/L (ref 3.5–5.1)
PROT SERPL-MCNC: 7.2 G/DL (ref 6.4–8.2)
RBC # BLD AUTO: 4.54 10X6/UL (ref 4.2–6.1)
ROULEAUX BLD QL SMEAR: (no result)
SODIUM SERPL-SCNC: 131 MMOL/L (ref 136–145)
SQUAMOUS #/AREA URNS HPF: (no result) /HPF (ref 0–5)
UROBILINOGEN UR-MCNC: NORMAL MG/DL (ref ?–2)
WBC # BLD AUTO: 25.5 10X3/UL (ref 4.8–10.8)
WBC #/AREA URNS HPF: (no result) HPF (ref 0–1)

## 2020-09-28 NOTE — NUR
SPOKE TO MOTHER.  STATES HE MUST HAVE THOUGHT HE WAS DISCHARGED AND LEFT.  IV
APPEARS TO HAVE BEEN PULLED OUT BY PATIENT AND IS LAYING ON THE BED.